# Patient Record
Sex: FEMALE | Race: AMERICAN INDIAN OR ALASKA NATIVE
[De-identification: names, ages, dates, MRNs, and addresses within clinical notes are randomized per-mention and may not be internally consistent; named-entity substitution may affect disease eponyms.]

---

## 2018-02-08 ENCOUNTER — HOSPITAL ENCOUNTER (INPATIENT)
Dept: HOSPITAL 5 - ED | Age: 53
LOS: 1 days | Discharge: HOME | DRG: 309 | End: 2018-02-09
Attending: INTERNAL MEDICINE | Admitting: INTERNAL MEDICINE
Payer: MEDICARE

## 2018-02-08 DIAGNOSIS — E66.01: ICD-10-CM

## 2018-02-08 DIAGNOSIS — F32.9: ICD-10-CM

## 2018-02-08 DIAGNOSIS — Z82.49: ICD-10-CM

## 2018-02-08 DIAGNOSIS — F10.10: ICD-10-CM

## 2018-02-08 DIAGNOSIS — I47.1: Primary | ICD-10-CM

## 2018-02-08 DIAGNOSIS — R06.03: ICD-10-CM

## 2018-02-08 DIAGNOSIS — Z87.891: ICD-10-CM

## 2018-02-08 DIAGNOSIS — K21.9: ICD-10-CM

## 2018-02-08 DIAGNOSIS — Z98.51: ICD-10-CM

## 2018-02-08 DIAGNOSIS — I10: ICD-10-CM

## 2018-02-08 DIAGNOSIS — I87.2: ICD-10-CM

## 2018-02-08 LAB
BASOPHILS # (AUTO): 0.1 K/MM3 (ref 0–0.1)
BASOPHILS NFR BLD AUTO: 1.1 % (ref 0–1.8)
BUN SERPL-MCNC: 10 MG/DL (ref 7–17)
BUN/CREAT SERPL: 14 %
CALCIUM SERPL-MCNC: 9.3 MG/DL (ref 8.4–10.2)
EOSINOPHIL # BLD AUTO: 0 K/MM3 (ref 0–0.4)
EOSINOPHIL NFR BLD AUTO: 0.9 % (ref 0–4.3)
HCT VFR BLD CALC: 42.1 % (ref 30.3–42.9)
HEMOLYSIS INDEX: 7
HGB BLD-MCNC: 13.8 GM/DL (ref 10.1–14.3)
LYMPHOCYTES # BLD AUTO: 1.3 K/MM3 (ref 1.2–5.4)
LYMPHOCYTES NFR BLD AUTO: 25.9 % (ref 13.4–35)
MCH RBC QN AUTO: 29 PG (ref 28–32)
MCHC RBC AUTO-ENTMCNC: 33 % (ref 30–34)
MCV RBC AUTO: 89 FL (ref 79–97)
MONOCYTES # (AUTO): 0.5 K/MM3 (ref 0–0.8)
MONOCYTES % (AUTO): 10.1 % (ref 0–7.3)
PLATELET # BLD: 307 K/MM3 (ref 140–440)
RBC # BLD AUTO: 4.75 M/MM3 (ref 3.65–5.03)

## 2018-02-08 PROCEDURE — 83735 ASSAY OF MAGNESIUM: CPT

## 2018-02-08 PROCEDURE — 84484 ASSAY OF TROPONIN QUANT: CPT

## 2018-02-08 PROCEDURE — 93005 ELECTROCARDIOGRAM TRACING: CPT

## 2018-02-08 PROCEDURE — 71045 X-RAY EXAM CHEST 1 VIEW: CPT

## 2018-02-08 PROCEDURE — 93010 ELECTROCARDIOGRAM REPORT: CPT

## 2018-02-08 PROCEDURE — 80053 COMPREHEN METABOLIC PANEL: CPT

## 2018-02-08 PROCEDURE — 83036 HEMOGLOBIN GLYCOSYLATED A1C: CPT

## 2018-02-08 PROCEDURE — 36415 COLL VENOUS BLD VENIPUNCTURE: CPT

## 2018-02-08 PROCEDURE — 85379 FIBRIN DEGRADATION QUANT: CPT

## 2018-02-08 PROCEDURE — 80048 BASIC METABOLIC PNL TOTAL CA: CPT

## 2018-02-08 PROCEDURE — 84443 ASSAY THYROID STIM HORMONE: CPT

## 2018-02-08 PROCEDURE — 85025 COMPLETE CBC W/AUTO DIFF WBC: CPT

## 2018-02-08 NOTE — XRAY REPORT
FINAL REPORT



PROCEDURE:  XR CHEST 1V AP



TECHNIQUE:  Chest radiograph anteroposterior view. CPT 81087







HISTORY:  Shortness of breath 



COMPARISON:  No prior studies are available for comparison.



FINDINGS:  

Heart: Normal.



Mediastinum/Vessels: Normal.



Lungs/Pleural space: No infiltrate, effusion, or pneumothorax.



Bony thorax: No acute osseous abnormality.



Life support devices: None.



IMPRESSION:  

No radiographic evidence of acute cardiopulmonary abnormality.

## 2018-02-08 NOTE — EMERGENCY DEPARTMENT REPORT
ED General Adult HPI





- General


Chief complaint: Chest Pain


Stated complaint: INCREASED HEART RATE/SHORTNESS OF BREATH/CP


Time Seen by Provider: 02/08/18 14:45


Source: patient, EMS


Mode of arrival: Stretcher


Limitations: No Limitations





- History of Present Illness


Initial comments: 





Patient is a 52-year-old female past medical history of obesity high blood 

pressure and depression who presents with shortness of breath and arrhythmia 

that occurred earlier on today.  Patient states that her shortness of breath 

was moderate she states that she went to her primary care provider and her 

heart rate was in the 180s.  They tried to do Basil Viviana versus and a lower 

patient's heart rate to the 120s.  Patient has no nausea no vomiting. Pt denies 


Severity scale (0 -10): 0





- Related Data


 Previous Rx's











 Medication  Instructions  Recorded  Last Taken  Type


 


Acetaminophen/Codeine 1 tab PO Q6H PRN #15 tab 01/18/15 Unknown Rx





[Acetaminophen-Codeine #3 TAB]    


 


Amoxicillin [Trimox CAP] 500 mg PO Q8H #30 capsule 01/18/15 Unknown Rx











 Allergies











Allergy/AdvReac Type Severity Reaction Status Date / Time


 


No Known Allergies Allergy   Verified 01/17/15 23:30














ED Review of Systems


ROS: 


Stated complaint: INCREASED HEART RATE/SHORTNESS OF BREATH/CP


Other details as noted in HPI





Constitutional: denies: chills, fever


Eyes: denies: eye pain, eye discharge, vision change


ENT: denies: ear pain, throat pain


Respiratory: shortness of breath.  denies: cough, wheezing


Cardiovascular: other (arrythmia).  denies: chest pain, palpitations


Endocrine: no symptoms reported


Gastrointestinal: denies: abdominal pain, nausea, diarrhea


Genitourinary: denies: urgency, dysuria, discharge


Musculoskeletal: denies: back pain, joint swelling, arthralgia


Skin: denies: rash, lesions


Neurological: denies: headache, weakness, paresthesias


Psychiatric: denies: anxiety, depression


Hematological/Lymphatic: denies: easy bleeding, easy bruising





ED Past Medical Hx





- Past Medical History


Previous Medical History?: Yes


Hx Hypertension: Yes


Hx GERD: Yes





- Surgical History


Past Surgical History?: Yes


Additional Surgical History: sinus surgery 1/9/15, tubal ligation 1989





- Social History


Smoking Status: Former Smoker


Substance Use Type: Alcohol





- Medications


Home Medications: 


 Home Medications











 Medication  Instructions  Recorded  Confirmed  Last Taken  Type


 


Acetaminophen/Codeine 1 tab PO Q6H PRN #15 tab 01/18/15  Unknown Rx





[Acetaminophen-Codeine #3 TAB]     


 


Amoxicillin [Trimox CAP] 500 mg PO Q8H #30 capsule 01/18/15  Unknown Rx














ED Physical Exam





- General


Limitations: No Limitations


General appearance: alert, in no apparent distress





- Head


Head exam: Present: atraumatic, normocephalic





- Eye


Eye exam: Present: normal appearance





- ENT


ENT exam: Present: mucous membranes moist





- Neck


Neck exam: Present: normal inspection





- Respiratory


Respiratory exam: Present: normal lung sounds bilaterally.  Absent: respiratory 

distress





- Cardiovascular


Cardiovascular Exam: Present: normal rhythm, tachycardia.  Absent: systolic 

murmur, diastolic murmur, rubs, gallop





- GI/Abdominal


GI/Abdominal exam: Present: soft, normal bowel sounds





- Extremities Exam


Extremities exam: Present: normal inspection





- Back Exam


Back exam: Present: normal inspection





- Neurological Exam


Neurological exam: Present: alert, oriented X3





- Psychiatric


Psychiatric exam: Present: normal affect, normal mood





- Skin


Skin exam: Present: warm, dry, intact, normal color.  Absent: rash





ED Course


 Vital Signs











  02/08/18 02/08/18 02/08/18





  14:00 14:08 14:30


 


Temperature  98.3 F 


 


Pulse Rate 96 H 100 H 95 H


 


Respiratory 15 12 13





Rate   


 


Blood Pressure  154/88 154/88


 


O2 Sat by Pulse 95 100 100





Oximetry   














  02/08/18 02/08/18 02/08/18





  14:44 15:00 15:30


 


Temperature   


 


Pulse Rate  98 H 85


 


Respiratory 10 L 10 L 14





Rate   


 


Blood Pressure  151/87 160/90


 


O2 Sat by Pulse 100  100





Oximetry   














  02/08/18





  15:31


 


Temperature 


 


Pulse Rate 94 H


 


Respiratory 





Rate 


 


Blood Pressure 160/90


 


O2 Sat by Pulse 





Oximetry 














ED Medical Decision Making





- Lab Data


Result diagrams: 


 02/08/18 15:03





 02/08/18 15:03








 Lab Results











  02/08/18 02/08/18 Range/Units





  15:03 15:03 


 


WBC  5.2   (4.5-11.0)  K/mm3


 


RBC  4.75   (3.65-5.03)  M/mm3


 


Hgb  13.8   (10.1-14.3)  gm/dl


 


Hct  42.1   (30.3-42.9)  %


 


MCV  89   (79-97)  fl


 


MCH  29   (28-32)  pg


 


MCHC  33   (30-34)  %


 


RDW  14.2   (13.2-15.2)  %


 


Plt Count  307   (140-440)  K/mm3


 


Lymph % (Auto)  25.9   (13.4-35.0)  %


 


Mono % (Auto)  10.1 H   (0.0-7.3)  %


 


Eos % (Auto)  0.9   (0.0-4.3)  %


 


Baso % (Auto)  1.1   (0.0-1.8)  %


 


Lymph #  1.3   (1.2-5.4)  K/mm3


 


Mono #  0.5   (0.0-0.8)  K/mm3


 


Eos #  0.0   (0.0-0.4)  K/mm3


 


Baso #  0.1   (0.0-0.1)  K/mm3


 


Seg Neutrophils %  62.0   (40.0-70.0)  %


 


Seg Neutrophils #  3.2   (1.8-7.7)  K/mm3


 


Sodium   141  (137-145)  mmol/L


 


Potassium   4.4  (3.6-5.0)  mmol/L


 


Chloride   101.0  ()  mmol/L


 


Carbon Dioxide   26  (22-30)  mmol/L


 


Anion Gap   18  mmol/L


 


BUN   10  (7-17)  mg/dL


 


Creatinine   0.7  (0.7-1.2)  mg/dL


 


Estimated GFR   > 60  ml/min


 


BUN/Creatinine Ratio   14  %


 


Glucose   109 H  ()  mg/dL


 


Calcium   9.3  (8.4-10.2)  mg/dL


 


Troponin T   < 0.010  (0.00-0.029)  ng/mL














- EKG Data


-: EKG Interpreted by Me





- EKG Data





02/08/18 17:49


EKG shows tachycardia no ST segment elevation or T-wave inversion normal axis





- Medical Decision Making


Cdx: SVT 


ddx: NSTEMI, arrythmia, electrolyte abnormality





CBC, CMP, EKG, IV metoprolol, troponin 








Due to patient having SVT I will admit patient to the hospitalist service. 





Critical care attestation.: 


If time is entered above; I have spent that time in minutes in the direct care 

of this critically ill patient, excluding procedure time.








ED Disposition


Clinical Impression: 


 SVT (supraventricular tachycardia), SOB (shortness of breath)





Disposition: DC-09 OP ADMIT IP TO THIS HOSP


Is pt being admited?: Yes


Does the pt Need Aspirin: No


Condition: Stable


Referrals: 


PRIMARY CARE,MD [Primary Care Provider] - 3-5 Days

## 2018-02-09 VITALS — SYSTOLIC BLOOD PRESSURE: 158 MMHG | DIASTOLIC BLOOD PRESSURE: 80 MMHG

## 2018-02-09 LAB
ALBUMIN SERPL-MCNC: 3.6 G/DL (ref 3.9–5)
ALT SERPL-CCNC: 16 UNITS/L (ref 7–56)
BASOPHILS # (AUTO): 0.1 K/MM3 (ref 0–0.1)
BASOPHILS NFR BLD AUTO: 0.9 % (ref 0–1.8)
BUN SERPL-MCNC: 10 MG/DL (ref 7–17)
BUN/CREAT SERPL: 14 %
CALCIUM SERPL-MCNC: 8.9 MG/DL (ref 8.4–10.2)
EOSINOPHIL # BLD AUTO: 0 K/MM3 (ref 0–0.4)
EOSINOPHIL NFR BLD AUTO: 0.5 % (ref 0–4.3)
HCT VFR BLD CALC: 39.7 % (ref 30.3–42.9)
HEMOLYSIS INDEX: 4
HGB BLD-MCNC: 13.1 GM/DL (ref 10.1–14.3)
LYMPHOCYTES # BLD AUTO: 1.7 K/MM3 (ref 1.2–5.4)
LYMPHOCYTES NFR BLD AUTO: 21.7 % (ref 13.4–35)
MCH RBC QN AUTO: 29 PG (ref 28–32)
MCHC RBC AUTO-ENTMCNC: 33 % (ref 30–34)
MCV RBC AUTO: 89 FL (ref 79–97)
MONOCYTES # (AUTO): 0.7 K/MM3 (ref 0–0.8)
MONOCYTES % (AUTO): 9 % (ref 0–7.3)
PLATELET # BLD: 307 K/MM3 (ref 140–440)
RBC # BLD AUTO: 4.47 M/MM3 (ref 3.65–5.03)

## 2018-02-09 NOTE — QUERY- GENERAL
Sukhjinder Raymundo_____Consuelo____________   Date:____2/9/18_________    



/RICARDO:_____Cindyit_________  Phone#:___[phone]________



Exercise your independent professional judgment when responding to this query. 

Questions asked do not imply a particular answer is desired or expected. We 
greatly 

appreciate your clarification on this issue.           

Clinical Documentation States:



52 year old female was admitted on 2/8/18



The Cardiology consult note states "  52 year old female presenting with 
palpitations and shortness of breath. ECG showing short RP tachycardia with a 
narrow QRS complex. Patient denies previous episodes of SVT 



Assessment and Plan



Short RP tachycardia c/w AVNRT "





Clinical Findings Show (include reference to source document):





BMI : 40.4



Given the above clinical scenario can you please provide an appropriate 
diagnosis

based on your knowledge of the patient:

PHYSICIAN RESPONSE:



[  ]  Obesity



[  x]  Morbid Obesity



[  ] Other (Please specify) _________________________



[  ] Clinically undeterminable





Present on Admission:    [ x] Yes (Y)       [ ] Clinically undeterminable (W)  
    [ ]No(N)  





Please also document response in your Progress Notes and/or Discharge Summary 
and 

indicate if the condition   was present on admission.













MTDD

## 2018-02-09 NOTE — HISTORY AND PHYSICAL REPORT
CHIEF COMPLAINT:  Sharp palpitations and shortness of breath since early

morning.



HISTORY OF PRESENT ILLNESS:  A 52-year-old female with a past medical history

significant for high blood pressure, obesity, and depression, presented with

shortness of breath and palpitations.   Her shortness of breath was

moderate and also, her heart rate was in 180s .  The

patient was sent here because of the severe tachycardia of 180 per minute.  No

chest pain.  Shortness of breath present.  Also feels weak.Had recent w/u for 
chest pain in [facility] including Echo whuch was normal.



PAST MEDICAL HISTORY:  Significant for hypertension and gastroesophageal reflux

disease.



PAST SURGICAL HISTORY:   surgery in January 2015, tubal ligation in 1989.



SOCIAL HISTORY:  Former smoker.  Alcohol occasionally.



FAMILY HISTORY:  Hypertension.



REVIEW OF SYSTEMS:

HEENT:  No sore throat, no postnasal drip.

GENERAL:  No fever, no weight loss, no anorexia.

NECK:  No neck stiffness or neck pain.

ABDOMEN:  Soft.

RESPIRATORY:  No rhonchi and no wheezing.

CARDIOVASCULAR:  Palpitations and shortness of breath present.  No chest pain.

GASTROINTESTINAL:  No nausea, no vomiting, no diarrhea.

GENITOURINARY:  No dysuria.

MUSCULOSKELETAL:  No flank pains, no muscle pains.

SKIN:  No rashes.



A 14-point review of systems done, essentially negative other than palpitations

and shortness of breath.



PHYSICAL EXAMINATION:

GENERAL:  Middle-aged female, cooperative during examination.

VITAL SIGNS:  Blood pressure is 142/83, temperature is 98, pulse is 78,

respirations are 12.

HEENT:  Unremarkable.  Pupils are equal and reactive.

NECK:  Supple, no lymphadenopathy, no thyromegaly.

LUNGS:  Clear to auscultation and percussion.

CARDIOVASCULAR:  S1, S2 heard.  Fast heart rate.  Could count up to 130-140

range.

ABDOMEN:  Soft and benign.  No hepatosplenomegaly.  No guarding, no rigidity. 

Hernial orifices are normal.

EXTREMITIES:  Good pedal pulses.  No pedal edema.

CENTRAL NERVOUS SYSTEM:  No focal deficits.



LABORATORY DATA:  White count is 5200, H and H are 13.8 and 42.1, platelet count

is 307,000.  Sodium is 141, potassium is 4.4, glucose is 109, BUN and creatinine

is 10 and 0.7.  EKG shows sinus tachycardia.  No ST segment elevations or T-wave

inversions.



ASSESSMENT AND PLAN:

1.  Supraventricular tachycardia.  The patient to be continued on Lopressor 50

q.12 h. .Cardiology consult requested. 

2.  Hypertension.  The patient to be continued on losartan 100 mg daily.

3.  Gastroesophageal reflux disease. 

4.Dvt prophylaxis  





DD: 02/09/2018 01:24

DT: 02/09/2018 03:19

JOB# [id number]  [id number]

VSM/CONCHIS

MTDD

## 2018-02-09 NOTE — CONSULTATION
History of Present Illness


Consult date: 02/09/18


Consult reason: tachycardia


History of present illness: 





52 year old female presenting with palpitations and shortness of breath. ECG 

showing short RP tachycardia with a narrow QRS complex. Patient denies previous 

episodes of SVT. She admits drinking two bottle of wine and champagne with her 

 yesterday. Patient is currently asymptomatic and in sinus rhythm. April 2017, she was hospitalized at [place] for atypical chest pain. Echo and SPECT 

imaging revealed normal LVEF and no ischemic respectively. 





Past History


Past Medical History: hypertension, other (depression)


Past Surgical History: No surgical history


Social history: alcohol abuse


Family history: hypertension





Medications and Allergies


 Allergies











Allergy/AdvReac Type Severity Reaction Status Date / Time


 


No Known Allergies Allergy   Verified 01/17/15 23:30











 Home Medications











 Medication  Instructions  Recorded  Confirmed  Last Taken  Type


 


Losartan/Hydrochlorothiazide 1 tab PO DAILY 02/08/18 02/08/18 02/07/18 History





[Losartan-Hctz 100-25 mg Tab]     











Active Meds: 


Active Medications





Acetaminophen (Tylenol)  650 mg PO Q4H PRN


   PRN Reason: Pain MILD(1-3)/Fever >100.5/HA


Bisacodyl (Dulcolax)  10 mg MD QDAY PRN


   PRN Reason: Constipation unrelieved by MOM


Enoxaparin Sodium (Lovenox)  40 mg SUB-Q QDAY On license of [facility]


Sodium Chloride (Nacl 0.9% 1000 Ml)  1,000 mls @ 75 mls/hr IV AS DIRECT On license of [facility]


   Last Admin: 02/09/18 02:55 Dose:  75 mls/hr


Magnesium Hydroxide (Milk Of Magnesia)  30 ml PO Q4H PRN


   PRN Reason: Constipation


Metoprolol Tartrate (Lopressor)  50 mg PO BID On license of [facility]


   Last Admin: 02/09/18 02:54 Dose:  50 mg


Morphine Sulfate (Morphine)  2 mg IV Q4H PRN


   PRN Reason: Pain, Moderate (4-6)


Ondansetron HCl (Zofran)  4 mg IV Q8H PRN


   PRN Reason: N/V unrelieved by Reglan


Oxycodone/Acetaminophen (Percocet 5/325)  1 tab PO Q6H PRN


   PRN Reason: Pain, Moderate (4-6)


Zolpidem Tartrate (Ambien)  5 mg PO QHS PRN


   PRN Reason: Insomnia











Review of Systems


All systems: negative





Physical Examination


 Vital Signs











Pulse Resp Pulse Ox


 


 96 H  15   95 


 


 02/08/18 14:00  02/08/18 14:00  02/08/18 14:00











General appearance: no acute distress


HEENT: Positive: PERRL


Neck: Positive: neck supple


Cardiac: Positive: Reg Rate and Rhythm


Lungs: Positive: Normal Exam


Neuro: Positive: Grossly Intact


Abdomen: Positive: Soft


Extremities: Present: edema





Results





 02/09/18 04:37





 02/09/18 04:37


 Cardiac Enzymes











  02/09/18 Range/Units





  04:37 


 


AST  16  (5-40)  units/L








 CBC











  02/08/18 02/09/18 Range/Units





  15:03 04:37 


 


WBC  5.2  7.8  (4.5-11.0)  K/mm3


 


RBC  4.75  4.47  (3.65-5.03)  M/mm3


 


Hgb  13.8  13.1  (10.1-14.3)  gm/dl


 


Hct  42.1  39.7  (30.3-42.9)  %


 


Plt Count  307  307  (140-440)  K/mm3


 


Lymph #  1.3  1.7  (1.2-5.4)  K/mm3


 


Mono #  0.5  0.7  (0.0-0.8)  K/mm3


 


Eos #  0.0  0.0  (0.0-0.4)  K/mm3


 


Baso #  0.1  0.1  (0.0-0.1)  K/mm3








 Comprehensive Metabolic Panel











  02/08/18 02/09/18 Range/Units





  15:03 04:37 


 


Sodium  141  139  (137-145)  mmol/L


 


Potassium  4.4  3.8  (3.6-5.0)  mmol/L


 


Chloride  101.0  102.1  ()  mmol/L


 


Carbon Dioxide  26  24  (22-30)  mmol/L


 


BUN  10  10  (7-17)  mg/dL


 


Creatinine  0.7  0.7  (0.7-1.2)  mg/dL


 


Glucose  109 H  131 H  ()  mg/dL


 


Calcium  9.3  8.9  (8.4-10.2)  mg/dL


 


AST   16  (5-40)  units/L


 


ALT   16  (7-56)  units/L


 


Alkaline Phosphatase   49  ()  units/L


 


Total Protein   6.6  (6.3-8.2)  g/dL


 


Albumin   3.6 L  (3.9-5)  g/dL














- EKG Interpretation


EKG: sinus rhythm





EKG interpretations





- Telemetry


EKG Rhythm: Sinus Rhythm





Assessment and Plan





Short RP tachycardia c/w AVNRT


Shortness of breath


   Normal LVEF 04/2017


   Normal MPI 04/2017


Systemic Hypertension


Alcohol abuse


Depression


Chronic LE edema due to venous insufficiency





Recommendations:





Start toprol XL 50 mg po daily (patient denies cocaine use)


Check D-Dimer


   If negative patient may go home; otherwise, she will need either a VQ scan 

or a CTA chest PE protocol


Advised against alcohol binging


No further cardiac work-up is needed